# Patient Record
Sex: FEMALE | Race: ASIAN | URBAN - METROPOLITAN AREA
[De-identification: names, ages, dates, MRNs, and addresses within clinical notes are randomized per-mention and may not be internally consistent; named-entity substitution may affect disease eponyms.]

---
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
## 2025-02-02 DIAGNOSIS — E66.811 OBESITY, CLASS I, BMI 30-34.9: ICD-10-CM

## 2025-02-02 DIAGNOSIS — E28.2 PCOS (POLYCYSTIC OVARIAN SYNDROME): ICD-10-CM

## 2025-02-04 DIAGNOSIS — E66.811 OBESITY, CLASS I, BMI 30-34.9: Primary | ICD-10-CM

## 2025-02-04 DIAGNOSIS — E28.2 PCOS (POLYCYSTIC OVARIAN SYNDROME): ICD-10-CM

## 2025-02-04 RX ORDER — SEMAGLUTIDE 0.25 MG/.5ML
INJECTION, SOLUTION SUBCUTANEOUS
OUTPATIENT
Start: 2025-02-04

## 2025-03-02 DIAGNOSIS — E66.811 OBESITY, CLASS I, BMI 30-34.9: ICD-10-CM

## 2025-03-02 DIAGNOSIS — E28.2 PCOS (POLYCYSTIC OVARIAN SYNDROME): ICD-10-CM

## 2025-03-03 RX ORDER — SEMAGLUTIDE 0.25 MG/.5ML
INJECTION, SOLUTION SUBCUTANEOUS
OUTPATIENT
Start: 2025-03-03

## 2025-03-04 ENCOUNTER — OFFICE VISIT (OUTPATIENT)
Dept: BARIATRICS | Facility: CLINIC | Age: 20
End: 2025-03-04
Payer: COMMERCIAL

## 2025-03-04 VITALS
SYSTOLIC BLOOD PRESSURE: 100 MMHG | WEIGHT: 171.2 LBS | HEART RATE: 94 BPM | HEIGHT: 62 IN | TEMPERATURE: 98.6 F | BODY MASS INDEX: 31.5 KG/M2 | RESPIRATION RATE: 16 BRPM | DIASTOLIC BLOOD PRESSURE: 63 MMHG

## 2025-03-04 DIAGNOSIS — E66.3 OVERWEIGHT (BMI 25.0-29.9): ICD-10-CM

## 2025-03-04 DIAGNOSIS — E28.2 PCOS (POLYCYSTIC OVARIAN SYNDROME): ICD-10-CM

## 2025-03-04 DIAGNOSIS — E66.09 CLASS 1 OBESITY DUE TO EXCESS CALORIES WITH BODY MASS INDEX (BMI) OF 34.0 TO 34.9 IN ADULT: Primary | ICD-10-CM

## 2025-03-04 DIAGNOSIS — E66.811 CLASS 1 OBESITY DUE TO EXCESS CALORIES WITH BODY MASS INDEX (BMI) OF 34.0 TO 34.9 IN ADULT: Primary | ICD-10-CM

## 2025-03-04 PROCEDURE — 99214 OFFICE O/P EST MOD 30 MIN: CPT | Performed by: STUDENT IN AN ORGANIZED HEALTH CARE EDUCATION/TRAINING PROGRAM

## 2025-03-04 NOTE — PROGRESS NOTES
Assessment & Plan  Class 1 obesity    Initial weight: 176 (12/2024)   Current weight: 171    TBW loss%: 2.8    Medication: Wegovy 0.5 increased to 1mg     Dietary Recommendations:    Recommend to avoid skipping any meals. Meal frequency (3 meals 2-3 snacks in between) can potentially improve metabolism and allow for better portion control by decreasing Ghrelin (hunger hormone). Protein (ie. Chicken, eggs, yogurt, cheese, protein shakes, bars)  throughout the day can help promote satiety and potential to improve metabolism via thermogenesis. Carbohydrates are essential as it is the body's main fuel source for daily activities.      Macronutrients: (Nutrients that the body needs for energy and support vital functions)    Protein is necessary for muscle growth/repair. Increased energy expenditure is used for the processes of breaking down and rebuilding proteins within the body (thermogenesis)   Carbs: Recommend that carbohydrates be consumed mostly during the times you are more active rather than high amounts before bedtime  Fats: Essential vitamins like A, D, and E, protect your organs, support cell growth and contribute to maintaining healthy cholesterol levels      Fluid intake which is at least half your body weight in ounces is necessary to help control cravings (decreasing confusion for appetite vs water deprivation) as the human body is made up of 50-70% of Fluids. If there is a diversion for water alone, would recommend flavored water (example-splash of lemonade or ice tea) to help promote compliance. Fluids include Teas, water, flavored water, seltzer water, coffee, shakes    Metabolism:    Metabolism can also be promoted by meal frequency and increased muscle mass via exercise      Daily Calorie Needs: are individualized and will need to take into account any fluid losses, increased activity levels which may need to be adjusted daily. Recommended to not skip any meals even if there is a lack of appetite as  "it can be suppressed by caffeine or any prior heavy meal due to Leptin (satiety hormone).  Calorie and fluid intake will need to be increased if there is any increased activity during the day compared to previous days.  With proper fluid and macronutrient intake throughout the day, portion control and decreased cravings will improve     Weight check: BMI and weight serve as only guidelines as it is not factor in muscle mass, fat, water. Weights can fluctuate depending on fluid shifts vs what foods are consumed prior to checking your weight      Return in about 2 months (around 5/4/2025), or she will schedule herself.     Most recent notes, labs and previous medical records were reviewed.       ______________________________________________________________________        Subjective:     Chief Complaint   Patient presents with    Follow-up     MWM-2m F/u; Waist-33in       HPI: 20-year-old female with PCOS, class I obesity presents to weight management for follow-up    Current Medication: Wegovy 0.5     Dietary Regimen:  Breakfast:  Yogurt, eggs            Lunch: Rice and Chicken   Dinner: Meat, Rice   Snacks: Pretezals, popcorns   Fluids: 3-4 bottles 24 oz,         Physical Activity:  3-4 times a week, cardio and strength training     Occupation: [employer] in [place] ([place]        Review Of Systems:  General: No pallor, no weakness   Pulmonary: Negative for shortness of breath  Chest: negative for chest pain  Gastrointestinal:  Negative for abdominal pain, diarrhea   Psychiatric/Behavioral:  Negative for behavioral problems, confusion, dysphoric mood and hallucinations.    All other systems reviewed and are negative.     Objective:  /63   Pulse 94   Temp 98.6 °F (37 °C)   Resp 16   Ht 5' 2.3\" (1.582 m)   Wt 77.7 kg (171 lb 3.2 oz)   BMI 31.01 kg/m²     Wt Readings from Last 30 Encounters:   03/04/25 77.7 kg (171 lb 3.2 oz)   12/23/24 80.1 kg (176 lb 9.6 oz) (94%, Z= 1.52)*   08/16/24 75.1 kg (165 lb 9.6 oz) " (90%, Z= 1.29)*     * Growth percentiles are based on [facility] (Girls, 2-20 Years) data.       Physical Exam  Constitutional:       General: No acute distress.  Well-nourished  HENT:      Head: Normocephalic and atraumatic.   Eyes:      Extraocular Movements: Extraocular movements intact.      Conjunctiva/pupils: Conjunctivae normal. Pupils are equal, round  Pulmonary:      Effort: Pulmonary effort is normal. No labored breathing   Neurological:      General: No focal deficit present.  AO x 3     Mental Status: Alert and oriented to person, place, and time. Mental status is at baseline.   Psychiatric:         Mood and Affect: Mood normal.         Behavior: Behavior normal.     Labs and Imaging  Recent labs and imaging have been personally reviewed.

## 2025-03-31 DIAGNOSIS — E66.811 OBESITY, CLASS I, BMI 30-34.9: ICD-10-CM

## 2025-03-31 DIAGNOSIS — E28.2 PCOS (POLYCYSTIC OVARIAN SYNDROME): ICD-10-CM

## 2025-04-01 DIAGNOSIS — E66.811 CLASS 1 OBESITY DUE TO EXCESS CALORIES WITH BODY MASS INDEX (BMI) OF 34.0 TO 34.9 IN ADULT: ICD-10-CM

## 2025-04-01 DIAGNOSIS — E66.09 CLASS 1 OBESITY DUE TO EXCESS CALORIES WITH BODY MASS INDEX (BMI) OF 34.0 TO 34.9 IN ADULT: ICD-10-CM

## 2025-04-01 DIAGNOSIS — E66.3 OVERWEIGHT (BMI 25.0-29.9): ICD-10-CM

## 2025-04-01 DIAGNOSIS — E28.2 PCOS (POLYCYSTIC OVARIAN SYNDROME): ICD-10-CM

## 2025-04-01 RX ORDER — SEMAGLUTIDE 1 MG/.5ML
INJECTION, SOLUTION SUBCUTANEOUS
OUTPATIENT
Start: 2025-04-01

## 2025-05-30 DIAGNOSIS — E66.09 CLASS 1 OBESITY DUE TO EXCESS CALORIES WITH BODY MASS INDEX (BMI) OF 34.0 TO 34.9 IN ADULT: ICD-10-CM

## 2025-05-30 DIAGNOSIS — E66.811 CLASS 1 OBESITY DUE TO EXCESS CALORIES WITH BODY MASS INDEX (BMI) OF 34.0 TO 34.9 IN ADULT: ICD-10-CM

## 2025-05-30 DIAGNOSIS — E28.2 PCOS (POLYCYSTIC OVARIAN SYNDROME): Primary | ICD-10-CM

## 2025-05-30 RX ORDER — SEMAGLUTIDE 2.4 MG/.75ML
2.4 INJECTION, SOLUTION SUBCUTANEOUS WEEKLY
Qty: 3 ML | Refills: 5 | Status: SHIPPED | OUTPATIENT
Start: 2025-05-30

## 2025-06-25 ENCOUNTER — OFFICE VISIT (OUTPATIENT)
Dept: BARIATRICS | Facility: CLINIC | Age: 20
End: 2025-06-25
Payer: COMMERCIAL

## 2025-06-25 VITALS
DIASTOLIC BLOOD PRESSURE: 72 MMHG | RESPIRATION RATE: 16 BRPM | WEIGHT: 157.2 LBS | HEIGHT: 62 IN | HEART RATE: 103 BPM | SYSTOLIC BLOOD PRESSURE: 116 MMHG | BODY MASS INDEX: 28.93 KG/M2

## 2025-06-25 DIAGNOSIS — E66.3 OVERWEIGHT: Primary | ICD-10-CM

## 2025-06-25 DIAGNOSIS — E66.09 CLASS 1 OBESITY DUE TO EXCESS CALORIES WITH BODY MASS INDEX (BMI) OF 34.0 TO 34.9 IN ADULT: ICD-10-CM

## 2025-06-25 DIAGNOSIS — E66.811 CLASS 1 OBESITY DUE TO EXCESS CALORIES WITH BODY MASS INDEX (BMI) OF 34.0 TO 34.9 IN ADULT: ICD-10-CM

## 2025-06-25 DIAGNOSIS — E28.2 PCOS (POLYCYSTIC OVARIAN SYNDROME): ICD-10-CM

## 2025-06-25 DIAGNOSIS — E55.9 VITAMIN D DEFICIENCY: ICD-10-CM

## 2025-06-25 PROCEDURE — 99214 OFFICE O/P EST MOD 30 MIN: CPT | Performed by: PHYSICIAN ASSISTANT

## 2025-06-25 RX ORDER — SEMAGLUTIDE 2.4 MG/.75ML
2.4 INJECTION, SOLUTION SUBCUTANEOUS WEEKLY
Qty: 3 ML | Refills: 5 | Status: SHIPPED | OUTPATIENT
Start: 2025-06-25 | End: 2025-12-10

## 2025-06-25 NOTE — ASSESSMENT & PLAN NOTE
-Patient is pursing conservative plan    -Initial weight loss goal of 5-10% weight loss for improved health   --Labs reviewed and will go for recheck    To continue on wegovy. To be starting 2.4mg .  Tolerating well.    Medication agreement signed 12/23/24 not currenlty on OCP but discussed she should use contraception     Initial weight: 176  Current Weight:157.2  Change:-19lb (-14lb from last visit)  Goals: 135lb    -recommend to continue with mindful eating. Making sure to eat enough protein  -try to drink at least 64 oz water daily  -doing well with exercise

## 2025-06-25 NOTE — PROGRESS NOTES
Assessment/Plan:    Overweight   -Patient is pursing conservative plan    -Initial weight loss goal of 5-10% weight loss for improved health   --Labs reviewed and will go for recheck    To continue on wegovy. To be starting 2.4mg .  Tolerating well.    Medication agreement signed 12/23/24 not currenlty on OCP but discussed she should use contraception     Initial weight: 176  Current Weight:157.2  Change:-19lb (-14lb from last visit)  Goals: 135lb    -recommend to continue with mindful eating. Making sure to eat enough protein  -try to drink at least 64 oz water daily  -doing well with exercise      PCOS (polycystic ovarian syndrome)  on wegovy   -should improve with weight loss, dietary, and lifestyle changes           Diagnoses and all orders for this visit:    Overweight  -     Vitamin D 25 hydroxy; Future  -     CBC and differential; Future  -     Comprehensive metabolic panel; Future    Vitamin D deficiency  -     Vitamin D 25 hydroxy; Future    PCOS (polycystic ovarian syndrome)  -     Semaglutide-Weight Management (Wegovy) 2.4 MG/0.75ML; Inject 0.75 mL (2.4 mg total) under the skin once a week    Class 1 obesity due to excess calories with body mass index (BMI) of 34.0 to 34.9 in adult  -     Semaglutide-Weight Management (Wegovy) 2.4 MG/0.75ML; Inject 0.75 mL (2.4 mg total) under the skin once a week          Subjective:   Chief Complaint   Patient presents with    Follow-up     MWM F/u; Waist 31in        Patient ID: Mihir Noble  is a 20 y.o. female with excess weight/obesity here to pursue weight managment.  Patient is pursuing Conservative Program.     HPI  History of Present Illness  The patient presents for weight management.    She reports a positive response to Wegovy, with no adverse effects noted. Her appetite is well-regulated, and she has not experienced any gastrointestinal disturbances or nausea related to the medication. She has been on a 1.7 mg dose of Wegovy for the past 3 months and is open  to increasing the dosage to 2.4 mg. She monitors her weight at home and believes it has remained stable recently. She does not engage in calorie tracking as her diet remains consistent. She acknowledges a need for increased protein intake, which primarily comes from her mother's home-cooked meals, typically chicken. Her breakfast routine varies depending on her work schedule, often consisting of yogurt and fruit.    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 She does not consume food during her 4-hour shifts but typically eats a sandwich or rice and chicken upon returning home. Her diet includes a variety of vegetables and she snacks on apple sauce throughout the day. She has not tried cottage cheese. She attempts to exercise at least 3 times a week and aims to drink three 24-ounce bottles of water daily. Her beverage choices are limited to diet juice or diet coke, and she consumes alcohol once a week. She has not observed any negative effects from alcohol consumption. She is currently not taking any vitamin D supplements.    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     MEDICATIONS  Current: Wegovy        Wt Readings from Last 10 Encounters:   06/25/25 71.3 kg (157 lb 3.2 oz)   03/04/25 77.7 kg (171 lb 3.2 oz)   12/23/24 80.1 kg (176 lb 9.6 oz) (94%, Z= 1.52)*   08/16/24 75.1 kg (165 lb 9.6 oz) (90%, Z= 1.29)*     * Growth percentiles are based on CDC (Girls, 2-20 Years) data.       Food logging:  Increased appetite/cravings:  Exercise:gym 3 x week  Hydration:75 oz water    Diet Recall:  B (sometimes):yogurt w/fruit   L: sandwich or protein, vegetable, starch  S: apple sauce   D: chicken w/rice and vegetable  The following portions of the patient's history were reviewed and updated as appropriate: She  has a past medical history of PCOS (polycystic ovarian syndrome).  She   Patient  Active Problem List    Diagnosis Date Noted    Insomnia 08/16/2024    PCOS (polycystic ovarian syndrome) 08/16/2024    Overweight 08/16/2024     She  has no past surgical history on file.  Her family history includes Diabetes in her father and mother; Hyperlipidemia in her father; Hypertension in her father.  She  reports that she has never smoked. She has never used smokeless tobacco. She reports that she does not drink alcohol and does not use drugs.  Current Outpatient Medications   Medication Sig Dispense Refill    buPROPion (WELLBUTRIN XL) 150 mg 24 hr tablet Take 1 tablet by mouth every morning      ergocalciferol (VITAMIN D2) 50,000 units Take 1 capsule (50,000 Units total) by mouth once a week 12 capsule 0    Semaglutide-Weight Management (Wegovy) 2.4 MG/0.75ML Inject 0.75 mL (2.4 mg total) under the skin once a week 3 mL 5    zolpidem (AMBIEN) 5 mg tablet Take 5-10 mg by mouth daily as needed (Patient not taking: Reported on 6/25/2025)       No current facility-administered medications for this visit.     Current Outpatient Medications on File Prior to Visit   Medication Sig    buPROPion (WELLBUTRIN XL) 150 mg 24 hr tablet Take 1 tablet by mouth every morning    ergocalciferol (VITAMIN D2) 50,000 units Take 1 capsule (50,000 Units total) by mouth once a week    [DISCONTINUED] Semaglutide-Weight Management (WEGOVY) 1.7 MG/0.75ML Inject 0.75 mL (1.7 mg total) under the skin once a week    zolpidem (AMBIEN) 5 mg tablet Take 5-10 mg by mouth daily as needed (Patient not taking: Reported on 6/25/2025)    [DISCONTINUED] Semaglutide-Weight Management (Wegovy) 2.4 MG/0.75ML Inject 0.75 mL (2.4 mg total) under the skin once a week (Patient not taking: Reported on 6/25/2025)     No current facility-administered medications on file prior to visit.     She has no known allergies..    Review of Systems   Constitutional:  Negative for fever.   Respiratory:  Negative for shortness of breath.    Cardiovascular:  Negative  "for chest pain and palpitations.   Gastrointestinal:  Negative for abdominal pain, constipation, diarrhea and vomiting.   Genitourinary:  Negative for difficulty urinating.   Skin:  Negative for rash.   Neurological:  Negative for headaches.   Psychiatric/Behavioral:  Negative for dysphoric mood.        Objective:    /72 (BP Location: Left arm, Patient Position: Sitting)   Pulse 103   Resp 16   Ht 5' 2.3\" (1.582 m)   Wt 71.3 kg (157 lb 3.2 oz)   LMP 06/04/2025   BMI 28.48 kg/m²      Physical Exam  Vitals and nursing note reviewed.   Constitutional:       General: She is not in acute distress.     Appearance: Normal appearance. She is well-developed.   HENT:      Head: Normocephalic and atraumatic.     Eyes:      Conjunctiva/sclera: Conjunctivae normal.     Neck:      Thyroid: No thyromegaly.   Pulmonary:      Effort: Pulmonary effort is normal. No respiratory distress.     Skin:     Findings: No rash (visible).     Neurological:      Mental Status: She is alert and oriented to person, place, and time.     Psychiatric:         Behavior: Behavior normal.         "

## 2025-07-18 DIAGNOSIS — E28.2 PCOS (POLYCYSTIC OVARIAN SYNDROME): ICD-10-CM

## 2025-07-18 DIAGNOSIS — E66.09 CLASS 1 OBESITY DUE TO EXCESS CALORIES WITH BODY MASS INDEX (BMI) OF 34.0 TO 34.9 IN ADULT: ICD-10-CM

## 2025-07-18 DIAGNOSIS — E66.811 CLASS 1 OBESITY DUE TO EXCESS CALORIES WITH BODY MASS INDEX (BMI) OF 34.0 TO 34.9 IN ADULT: ICD-10-CM

## 2025-07-21 RX ORDER — SEMAGLUTIDE 2.4 MG/.75ML
2.4 INJECTION, SOLUTION SUBCUTANEOUS WEEKLY
Qty: 3 ML | Refills: 5 | Status: SHIPPED | OUTPATIENT
Start: 2025-07-21 | End: 2026-01-05

## 2025-07-24 ENCOUNTER — TELEPHONE (OUTPATIENT)
Dept: BARIATRICS | Facility: CLINIC | Age: 20
End: 2025-07-24

## 2025-07-24 NOTE — TELEPHONE ENCOUNTER
Patient called the RX Refill Line. Message is being forwarded to the office.     Patient called stating that her pharmacy informed her that a prior auth is needed for Semaglutide-Weight Management (Wegovy) 2.4 MG/0.75ML     Please contact patient at [phone] with any further questions

## 2025-07-28 ENCOUNTER — TELEPHONE (OUTPATIENT)
Dept: BARIATRICS | Facility: CLINIC | Age: 20
End: 2025-07-28

## 2025-08-06 LAB
25(OH)D3+25(OH)D2 SERPL-MCNC: 18 NG/ML (ref 30–100)
ALBUMIN SERPL-MCNC: 3.9 G/DL (ref 3.5–5.7)
ALP SERPL-CCNC: 48 U/L (ref 35–120)
ALT SERPL-CCNC: 8 U/L
ANION GAP SERPL CALCULATED.3IONS-SCNC: 8 MMOL/L (ref 3–11)
AST SERPL-CCNC: 12 U/L
BASOPHILS # BLD AUTO: 0.1 THOU/CMM (ref 0–0.1)
BASOPHILS NFR BLD AUTO: 1 %
BILIRUB SERPL-MCNC: 0.4 MG/DL (ref 0.2–1)
BUN SERPL-MCNC: 8 MG/DL (ref 7–25)
CALCIUM SERPL-MCNC: 9.4 MG/DL (ref 8.5–10.5)
CHLORIDE SERPL-SCNC: 106 MMOL/L (ref 100–109)
CO2 SERPL-SCNC: 24 MMOL/L (ref 21–31)
CREAT SERPL-MCNC: 0.53 MG/DL (ref 0.4–1.1)
CYTOLOGY CMNT CVX/VAG CYTO-IMP: NORMAL
DIFFERENTIAL METHOD BLD: ABNORMAL
EOSINOPHIL # BLD AUTO: 0.5 THOU/CMM (ref 0–0.5)
EOSINOPHIL NFR BLD AUTO: 5 %
ERYTHROCYTE [DISTWIDTH] IN BLOOD BY AUTOMATED COUNT: 12 % (ref 12–16)
GFR/BSA.PRED SERPLBLD CYS-BASED-ARV: 135 ML/MIN/{1.73_M2}
GLUCOSE SERPL-MCNC: 81 MG/DL (ref 65–99)
HCT VFR BLD AUTO: 35 % (ref 35–43)
HGB BLD-MCNC: 11.9 G/DL (ref 11.5–14.5)
LYMPHOCYTES # BLD AUTO: 2.9 THOU/CMM (ref 1–3)
LYMPHOCYTES NFR BLD AUTO: 28 %
MCH RBC QN AUTO: 30.6 PG (ref 26–34)
MCHC RBC AUTO-ENTMCNC: 34.2 G/DL (ref 32–37)
MCV RBC AUTO: 90 FL (ref 80–100)
MONOCYTES # BLD AUTO: 0.4 THOU/CMM (ref 0.3–1)
MONOCYTES NFR BLD AUTO: 4 %
NEUTROPHILS # BLD AUTO: 6.6 THOU/CMM (ref 1.8–7.8)
NEUTROPHILS NFR BLD AUTO: 62 %
PLATELET # BLD AUTO: 330 THOU/CMM (ref 140–350)
PMV BLD REES-ECKER: 8.6 FL (ref 7.5–11.3)
POTASSIUM SERPL-SCNC: 5 MMOL/L (ref 3.5–5.2)
PROT SERPL-MCNC: 7.5 G/DL (ref 6.3–8.3)
RBC # BLD AUTO: 3.9 MILL/CMM (ref 3.7–4.7)
SODIUM SERPL-SCNC: 138 MMOL/L (ref 135–145)
WBC # BLD AUTO: 10.5 THOU/CMM (ref 4–10)